# Patient Record
Sex: FEMALE | Race: BLACK OR AFRICAN AMERICAN | NOT HISPANIC OR LATINO | URBAN - METROPOLITAN AREA
[De-identification: names, ages, dates, MRNs, and addresses within clinical notes are randomized per-mention and may not be internally consistent; named-entity substitution may affect disease eponyms.]

---

## 2020-02-25 ENCOUNTER — EMERGENCY (EMERGENCY)
Facility: HOSPITAL | Age: 55
LOS: 0 days | Discharge: HOME | End: 2020-02-25
Attending: EMERGENCY MEDICINE
Payer: COMMERCIAL

## 2020-02-25 VITALS
TEMPERATURE: 98 F | SYSTOLIC BLOOD PRESSURE: 145 MMHG | DIASTOLIC BLOOD PRESSURE: 90 MMHG | HEART RATE: 70 BPM | OXYGEN SATURATION: 99 % | RESPIRATION RATE: 19 BRPM

## 2020-02-25 VITALS
TEMPERATURE: 98 F | SYSTOLIC BLOOD PRESSURE: 144 MMHG | OXYGEN SATURATION: 100 % | HEIGHT: 72 IN | HEART RATE: 67 BPM | WEIGHT: 263.89 LBS | RESPIRATION RATE: 18 BRPM | DIASTOLIC BLOOD PRESSURE: 86 MMHG

## 2020-02-25 DIAGNOSIS — H57.89 OTHER SPECIFIED DISORDERS OF EYE AND ADNEXA: ICD-10-CM

## 2020-02-25 DIAGNOSIS — H10.9 UNSPECIFIED CONJUNCTIVITIS: ICD-10-CM

## 2020-02-25 PROCEDURE — 99053 MED SERV 10PM-8AM 24 HR FAC: CPT

## 2020-02-25 PROCEDURE — 99282 EMERGENCY DEPT VISIT SF MDM: CPT

## 2020-02-25 NOTE — ED PROVIDER NOTE - OBJECTIVE STATEMENT
Pertinent PMH/PSH/FHx/SHx and Review of Systems contained within:    54yo F w no significant PMH presents to ED for eval of tearing from L eye.  Pt denies exposure to known irritants, trauma, vision changes, contact lens use.  Pt states she suddenly noted a FB sensation in L eye at 12o'clock.  Pt then received eye drops for allergies which she used w mild relief.  PT then follow up w her doctor & was prescribed abx eye drops which she states exacerbated the tearing.  No vision changes/flashing lights or purulent drainage.    No fever/chills, No photophobia/changes in vision, No ear pain/sore throat/dysphagia, No chest pain/palpitations, no SOB/cough/wheeze/stridor, No abdominal pain, no rash, no changes in neurological status/function.

## 2020-02-25 NOTE — ED ADULT NURSE NOTE - OBJECTIVE STATEMENT
Pt presents c/o increase in left eye tearing and sensation of something is in her eye after using eye drops that were prescribed for conjunctivitis by urgent care.  No fever, chills, SOB, rhinitis, sinus pressure or other complaints. Denies injury to the eye. +photophobia. No blurry vision or double vision. there is pain with eye movement. respirations even and unlabored. MD at bedside performing eye exam. 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 will continue to monitor awaiting dispo.

## 2020-02-25 NOTE — ED PROVIDER NOTE - CARE PROVIDER_API CALL
Ethan Ly)  Ophthalmology  [facility]  [place] [address]  Phone: [phone]  Fax: [phone]  Follow Up Time:

## 2020-02-25 NOTE — ED PROVIDER NOTE - CLINICAL SUMMARY MEDICAL DECISION MAKING FREE TEXT BOX
Pt w above dx, vision intact, pt in NAD, dc to ophthalmology clinic.  Instructed to return to ED if sx worsen.

## 2020-02-25 NOTE — ED PROVIDER NOTE - PATIENT PORTAL LINK FT
You can access the FollowMyHealth Patient Portal offered by [facility] by registering at the following website: [email or web address]. By joining [email or web address]’s FollowMyHealth portal, you will also be able to view your health information using other applications (apps) compatible with our system.

## 2020-02-25 NOTE — ED PROVIDER NOTE - NSFOLLOWUPCLINICS_GEN_ALL_ED_FT
[facility] - Ophthalmology  Ophthalmology  [address], [facility] [address]  [place] [address]  Phone: [phone]  Fax:   Follow Up Time:

## 2021-07-09 ENCOUNTER — EMERGENCY (EMERGENCY)
Facility: HOSPITAL | Age: 56
LOS: 1 days | Discharge: HOME | End: 2021-07-09
Admitting: STUDENT IN AN ORGANIZED HEALTH CARE EDUCATION/TRAINING PROGRAM
Payer: COMMERCIAL

## 2021-07-09 VITALS
SYSTOLIC BLOOD PRESSURE: 106 MMHG | DIASTOLIC BLOOD PRESSURE: 64 MMHG | HEIGHT: 72 IN | TEMPERATURE: 98 F | HEART RATE: 72 BPM | OXYGEN SATURATION: 100 % | RESPIRATION RATE: 18 BRPM

## 2021-07-09 PROCEDURE — 99283 EMERGENCY DEPT VISIT LOW MDM: CPT

## 2021-07-09 NOTE — ED PROVIDER NOTE - OBJECTIVE STATEMENT
this is a 56 y.o female with a PMhx of HTN, DM, compliant with medication presented to the ED after having a fall earlier today, pt states that she was walking into [employer] when she tripped and fell, patient states that she is unsure if she tripped on the carpet or her shoes however denies having any dizziness, lightheadedness prior to fall, nor did she have any Chest pain. Patient states that she did hit her head however denies having any LOC, nor is she having any headache or dizziness at this time. Patient states that she was able to get up under her own power. Patient is having some mild discomfort in the knees. Patient states she has small abrasion on the knees. Patient states that her tetanus is uptodate. Patient denies having any nausea, vomiting, diarrhea, abdominal pain, CP, SOB, BUENO.

## 2021-07-09 NOTE — ED PROVIDER NOTE - NSFOLLOWUPINSTRUCTIONS_ED_ALL_ED_FT
Rest, drink plenty of fluids.  Advance activity as tolerated.  Continue all previously prescribed medications as directed.  Follow up with your primary care physician in 48-72 hours- bring copies of your results.  Return to the ER for worsening or persistent symptoms, and/or ANY NEW OR CONCERNING SYMPTOMS. If you have issues obtaining follow up, please call: [phone] to obtain a doctor or specialist who takes your insurance in your area.  You may call [phone] to make an appointment with the internal medicine clinic.    Please return if symptoms persist or worsen.

## 2021-07-09 NOTE — ED PROVIDER NOTE - CLINICAL SUMMARY MEDICAL DECISION MAKING FREE TEXT BOX
his is a 56 y.o female with a PMhx of HTN, DM, compliant with medication presented to the ED after having a fall earlier today. low suspicion for fracture, wound care patient given precautions to comeback if experiencing worsening of her symptoms. his is a 56 y.o female with a PMhx of HTN, DM, compliant with medication presented to the ED after having a fall earlier today. low suspicion for fracture, wound care patient given precautions to comeback if experiencing worsening of her symptoms. wound care, tetanus uptodate. return if symptoms persist or worsen.

## 2021-07-09 NOTE — ED ADULT TRIAGE NOTE - CHIEF COMPLAINT QUOTE
Pt had mechanical fall off of edge of sidewalk scraping B/L knees and hitting right side of forehead. Denies LOC or blood thinner use. Denies PMH

## 2021-07-09 NOTE — ED PROVIDER NOTE - PATIENT PORTAL LINK FT
You can access the FollowMyHealth Patient Portal offered by [facility] by registering at the following website: [email or web address]. By joining [email or web address]’s FollowMyHealth portal, you will also be able to view your health information using other applications (apps) compatible with our system.

## 2021-07-09 NOTE — ED PROVIDER NOTE - SKIN COLOR
abrasions noted on bilateral knees, no erythema swelling or discharge. abrasion noted on the right side of the forehead no erythema, swelling or discharge from the area./normal for race

## 2021-07-09 NOTE — ED PROVIDER NOTE - CROS ED RESP ALL NEG
I do see future order from November 2016 for FLP in Epic.    LDL Cholesterol Calculated   Date Value Ref Range Status   02/03/2017 135 (H) <100 mg/dL Final     Comment:     Above desirable:  100-129 mg/dl   Borderline High:  130-159 mg/dL   High:             160-189 mg/dL   Very high:       >189 mg/dl     ]  Does not appear from lab result note in Feb that a repeat FLP was needed.    Routed to [facility] to advise on any labs needed prior to or can they be done day of appointment (?TSH)    TSH   Date Value Ref Range Status   02/03/2017 18.94 (H) 0.40 - 4.00 mU/L Final   ]  Ibeth Beaver RN  [facility]       negative...

## 2024-01-02 NOTE — ED PROVIDER NOTE - CONDITION AT DISCHARGE:
Patient is calling back.      Caller returning call to Clinical Team- Connected to CMA- Ángela- CONNECT CALL TO CALL CENTER CMA QUEUE- ROUTE MESSAGE TO CALL CENTER CMA POOL ([phone]) .      Improved